# Patient Record
Sex: MALE | Race: WHITE | NOT HISPANIC OR LATINO | URBAN - METROPOLITAN AREA
[De-identification: names, ages, dates, MRNs, and addresses within clinical notes are randomized per-mention and may not be internally consistent; named-entity substitution may affect disease eponyms.]

---

## 2018-11-13 ENCOUNTER — TRANSFERRED RECORDS (OUTPATIENT)
Dept: HEALTH INFORMATION MANAGEMENT | Facility: CLINIC | Age: 38
End: 2018-11-13

## 2019-02-26 ENCOUNTER — TRANSFERRED RECORDS (OUTPATIENT)
Dept: HEALTH INFORMATION MANAGEMENT | Facility: CLINIC | Age: 39
End: 2019-02-26

## 2019-05-10 NOTE — TELEPHONE ENCOUNTER
RECORDS RECEIVED FROM: Self Referral   DATE RECEIVED: 6/4/19   NOTES (FOR ALL VISITS) STATUS DETAILS   OFFICE NOTE from referring provider N/A    OFFICE NOTE from other specialist Received Dr. Greer @ [facility]:  2/26/19   DISCHARGE SUMMARY from hospital N/A    DISCHARGE REPORT from the ER N/A    OPERATIVE REPORT N/A    MEDICATION LIST Received    IMAGING  (FOR ALL VISITS)     EMG N/A    EEG N/A    ECT N/A    MRI (HEAD, NECK, SPINE) N/A    CAROL Scan N/A    CT (HEAD, NECK, SPINE) N/A       Action    Action Taken Sent Kodak Samayoa an inbasket to see if he has records or the referral     Phone Call:     Contact Name Neo Dorie   Outcome Spoke with patient - he was working and unable to talk. Patient requested I leave him a voicemail with my call back information.    Called patient back, however his voice mail was full. Unable to leave a message. Will try calling patient again.

## 2019-05-23 NOTE — TELEPHONE ENCOUNTER
Phone Call:     Contact Name Neo Oshea   Outcome Spoke with patient regarding outside records:    Patient stated he does not have any outside neurology records. He has only seen his PCP Dr Sharron Greer at [place]. Will send records request.       Action    Action Taken Records request faxed to [facility] at [place].    Fax [phone]

## 2019-05-28 ENCOUNTER — TRANSFERRED RECORDS (OUTPATIENT)
Dept: HEALTH INFORMATION MANAGEMENT | Facility: CLINIC | Age: 39
End: 2019-05-28

## 2019-05-28 RX ORDER — IBUPROFEN 400 MG/1
800 TABLET, FILM COATED ORAL
COMMUNITY
End: 2021-11-30
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
## 2019-06-04 ENCOUNTER — OFFICE VISIT (OUTPATIENT)
Dept: PSYCHOLOGY | Facility: CLINIC | Age: 39
End: 2019-06-04
Attending: PSYCHOLOGIST
Payer: COMMERCIAL

## 2019-06-04 ENCOUNTER — PRE VISIT (OUTPATIENT)
Dept: NEUROLOGY | Facility: CLINIC | Age: 39
End: 2019-06-04

## 2019-06-04 ENCOUNTER — OFFICE VISIT (OUTPATIENT)
Dept: NEUROLOGY | Facility: CLINIC | Age: 39
End: 2019-06-04
Payer: COMMERCIAL

## 2019-06-04 VITALS
SYSTOLIC BLOOD PRESSURE: 140 MMHG | WEIGHT: 305 LBS | HEIGHT: 74 IN | HEART RATE: 108 BPM | BODY MASS INDEX: 39.14 KG/M2 | OXYGEN SATURATION: 99 % | DIASTOLIC BLOOD PRESSURE: 66 MMHG

## 2019-06-04 DIAGNOSIS — Z82.0 FAMILY HISTORY OF NEUROLOGICAL DISORDER: Primary | ICD-10-CM

## 2019-06-04 DIAGNOSIS — Z82.0 FAMILY HISTORY OF HUNTINGTON'S DISEASE: ICD-10-CM

## 2019-06-04 DIAGNOSIS — M25.80 SESAMOIDITIS: ICD-10-CM

## 2019-06-04 DIAGNOSIS — M54.32 LEFT SCIATIC NERVE PAIN: ICD-10-CM

## 2019-06-04 DIAGNOSIS — D17.30 LIPOMA OF SKIN AND SUBCUTANEOUS TISSUE: ICD-10-CM

## 2019-06-04 DIAGNOSIS — Z82.0 FAMILY HISTORY OF HUNTINGTON'S DISEASE: Primary | ICD-10-CM

## 2019-06-04 DIAGNOSIS — Z87.81 HISTORY OF FRACTURE OF LEG: ICD-10-CM

## 2019-06-04 DIAGNOSIS — K21.00 GASTROESOPHAGEAL REFLUX DISEASE WITH ESOPHAGITIS: ICD-10-CM

## 2019-06-04 DIAGNOSIS — F43.20 ADJUSTMENT DISORDER, UNSPECIFIED TYPE: Primary | ICD-10-CM

## 2019-06-04 DIAGNOSIS — M54.31 RIGHT SIDED SCIATICA: ICD-10-CM

## 2019-06-04 PROBLEM — M54.50 LOW BACK PAIN: Status: ACTIVE | Noted: 2019-06-04

## 2019-06-04 PROBLEM — E66.3 OVERWEIGHT (BMI 25.0-29.9): Status: ACTIVE | Noted: 2019-06-04

## 2019-06-04 PROBLEM — F17.200 SMOKING: Status: ACTIVE | Noted: 2019-06-04

## 2019-06-04 PROBLEM — S99.912A LEFT ANKLE INJURY: Status: ACTIVE | Noted: 2019-06-04

## 2019-06-04 PROCEDURE — 90791 PSYCH DIAGNOSTIC EVALUATION: CPT | Mod: ZP | Performed by: PSYCHOLOGIST

## 2019-06-04 PROCEDURE — 40000114 ZZH STATISTIC NO CHARGE CLINIC VISIT

## 2019-06-04 ASSESSMENT — ENCOUNTER SYMPTOMS
NIGHT SWEATS: 0
DEPRESSION: 1
INSOMNIA: 1
NERVOUS/ANXIOUS: 1
POLYPHAGIA: 0
DECREASED APPETITE: 0
CHILLS: 0
INCREASED ENERGY: 1
FATIGUE: 0
WEIGHT GAIN: 0
PANIC: 0
ALTERED TEMPERATURE REGULATION: 0
DECREASED CONCENTRATION: 0
POLYDIPSIA: 0
WEIGHT LOSS: 0
HALLUCINATIONS: 0

## 2019-06-04 ASSESSMENT — MIFFLIN-ST. JEOR: SCORE: 2378.78

## 2019-06-04 ASSESSMENT — PAIN SCALES - GENERAL: PAINLEVEL: NO PAIN (0)
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
## 2019-06-04 NOTE — LETTER
6/4/2019       RE: Neo Oshea  [address] [address]     Dear Colleague,    Thank you for referring your patient, Neo Oshea, to the [facility] NEUROLOGY at [facility]. Please see a copy of my visit note below.    GENETIC COUNSELING-Neurology  I met with Neo Oshea for 40 minutes in the neurology clinic at [facility]. He has a family history of Alicia disease and came to clinic to discuss predictive genetic testing.    MEDICAL HISTORY-  Please see Dr. Costa's clinic note for a thorough summary of medical history. Briefly, Neo reports no current neurologic concerns suggestive of [place] disease.  He is requesting a predictive genetic test.    FAMILY HISTORY-please see scanned pedigree  1. Neo reported that his mother passed from complications of HD. She had long-standing pyschiatric and behavioral difficulties prior to her diagnosis and the diagnosis was not immediately made as she did not have a known family history of HD.  He believes that his mother had genetic testing, but did not have records to confirm this.  2. Neo has 1 full sibling and 4 maternal half siblings who are at risk for HD.  3. Neo had limited contact with his maternal grandparents. To his knowledge, he is not aware that either had HD.  4. Neo has no biological children.    GENETIC COUNSELING  We discussed the genetic basis of HD. I explained that it is caused by expansion of a trinculeotide repeat in the HTT gene. I explained that it is passed in an autosomal dominant pattern. Assuming the diagnosis of HD in his mother was correct, he has a 50% chance that he has inherited the CAG repeat expansion that causes HD. I explained that if we test him and both repeat numbers are less than 27, then he would not develop HD.  I explained that if either repeat number is 40 or larger, then we would expect him to develop HD during his  lifetime. He did indicate that he does not forsee having any children in the future- but I did state that if he did- the risk to his children would be 50% if he tests positive for HD. I also discussed the significance of intermediate and reduced penetrance alleles.    We discussed reasons why some people pursue predictive genetic testing.  Neo stated that he would like to be tested in order to make informed plans for the future- and would also like to contribute by participating in research trials if possible.    Neo met with our psychologist, Dr. Yesenia Stevens, following our visit. He provided written consent for predictive HD testing. I explained that we will schedule an appointment in ~3 weeks to review results in person. He plans to bring his significant other to the appointment.    I did explain that it would be helpful to confirm that his mother had genetic testing to confirm her diagnosis. I explained that if Neo tests negative, we would want to see that his mother actually tested positive in order to confirm that we have excluded the correct condition in him. He planned to speak to one of his maternal half siblings to get this information.    Kodak Samayoa MS, [facility]  Licensed Genetic Counselor    Again, thank you for allowing me to participate in the care of your patient.      Sincerely,    Malick Samayoa, GC

## 2019-06-04 NOTE — LETTER
Date:June 12, 2019      Patient was self referred, no letter generated. Do not send.        [facility] Information
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
## 2019-06-06 NOTE — PROGRESS NOTES
GENETIC COUNSELING-Neurology  I met with Neo Oshea for 40 minutes in the neurology clinic at [facility]. He has a family history of [place] disease and came to clinic to discuss predictive genetic testing.    MEDICAL HISTORY-  Please see Dr. Costa's clinic note for a thorough summary of medical history. Briefly, Neo reports no current neurologic concerns suggestive of [place] disease.  He is requesting a predictive genetic test.    FAMILY HISTORY-please see scanned pedigree  1. Neo reported that his mother passed from complications of HD. She had long-standing pyschiatric and behavioral difficulties prior to her diagnosis and the diagnosis was not immediately made as she did not have a known family history of HD.  He believes that his mother had genetic testing, but did not have records to confirm this.  2. Neo has 1 full sibling and 4 maternal half siblings who are at risk for HD.  3. Neo had limited contact with his maternal grandparents. To his knowledge, he is not aware that either had HD.  4. Neo has no biological children.    GENETIC COUNSELING  We discussed the genetic basis of HD. I explained that it is caused by expansion of a trinculeotide repeat in the HTT gene. I explained that it is passed in an autosomal dominant pattern. Assuming the diagnosis of HD in his mother was correct, he has a 50% chance that he has inherited the CAG repeat expansion that causes HD. I explained that if we test him and both repeat numbers are less than 27, then he would not develop HD.  I explained that if either repeat number is 40 or larger, then we would expect him to develop HD during his lifetime. He did indicate that he does not forsee having any children in the future- but I did state that if he did- the risk to his children would be 50% if he tests positive for HD. I also discussed the significance of intermediate and reduced penetrance alleles.    We discussed  reasons why some people pursue predictive genetic testing.  Neo stated that he would like to be tested in order to make informed plans for the future- and would also like to contribute by participating in research trials if possible.    Neo met with our psychologist, Dr. Yesenia Stevens, following our visit. He provided written consent for predictive HD testing. I explained that we will schedule an appointment in ~3 weeks to review results in person. He plans to bring his significant other to the appointment.    I did explain that it would be helpful to confirm that his mother had genetic testing to confirm her diagnosis. I explained that if Neo tests negative, we would want to see that his mother actually tested positive in order to confirm that we have excluded the correct condition in him. He planned to speak to one of his maternal half siblings to get this information.    Kodak Samayoa MS, [facility]  Licensed Genetic Counselor

## 2019-06-11 ENCOUNTER — TELEPHONE (OUTPATIENT)
Dept: NEUROLOGY | Facility: CLINIC | Age: 39
End: 2019-06-11

## 2019-06-11 NOTE — TELEPHONE ENCOUNTER
GENETIC COUNSELING-Neurology  I left a message for Neo. I was attempting to schedule his follow-up visit and noticed that he did not have a blood draw at his initial visit. I called to inquire if he had decided not to have testing, or if this was an oversight.  I asked him to call me to make arrangements if he wants to have the HD testing.    Kodak Samayoa MS, [facility]  Licensed Genetic Counselor

## 2019-06-18 ENCOUNTER — TELEPHONE (OUTPATIENT)
Dept: NEUROLOGY | Facility: CLINIC | Age: 39
End: 2019-06-18

## 2019-07-01 ENCOUNTER — TELEPHONE (OUTPATIENT)
Dept: NEUROLOGY | Facility: CLINIC | Age: 39
End: 2019-07-01

## 2019-07-01 ENCOUNTER — TRANSFERRED RECORDS (OUTPATIENT)
Dept: HEALTH INFORMATION MANAGEMENT | Facility: CLINIC | Age: 39
End: 2019-07-01

## 2019-07-01 DIAGNOSIS — Z82.0 FAMILY HISTORY OF HUNTINGTON'S DISEASE: ICD-10-CM

## 2019-07-01 LAB — HUNTINGTON DISEASE MOLECULAR ANALYSIS: NORMAL

## 2019-07-01 PROCEDURE — 81271 HTT GENE DETC ABNOR ALLELES: CPT | Performed by: PSYCHIATRY & NEUROLOGY

## 2019-07-01 NOTE — TELEPHONE ENCOUNTER
GENETIC COUNSELING-Neurology  Neo called me to let me know that he was in the [facility] morning. I made an appointment for a lab draw for him and scheduled for him to return for [place] disease results on 8/6/2019 at 10:00 AM.    Kodak Samayoa MS, [facility]  Licensed Genetic Counselor

## 2019-08-06 ENCOUNTER — OFFICE VISIT (OUTPATIENT)
Dept: PSYCHOLOGY | Facility: CLINIC | Age: 39
End: 2019-08-06
Attending: PSYCHOLOGIST
Payer: COMMERCIAL

## 2019-08-06 ENCOUNTER — DOCUMENTATION ONLY (OUTPATIENT)
Dept: NEUROLOGY | Facility: CLINIC | Age: 39
End: 2019-08-06

## 2019-08-06 DIAGNOSIS — F43.20 ADJUSTMENT DISORDER, UNSPECIFIED TYPE: Primary | ICD-10-CM

## 2019-08-06 PROCEDURE — 90834 PSYTX W PT 45 MINUTES: CPT | Mod: ZP | Performed by: PSYCHOLOGIST

## 2019-08-06 PROCEDURE — 40000114 ZZH STATISTIC NO CHARGE CLINIC VISIT

## 2019-08-06 NOTE — TELEPHONE ENCOUNTER
GENETIC COUNSELING-Neurology  I met with Neo Oshea today in conjunction with Dr. Yesenia Stevens. We met to review his genetic test results for [place] disease.  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 I explained that this is a difficult result to interpret as repeat numbers between 36-39 are associated with reduced penetrance. I explained that some individuals in this range develop symptoms of HD during their lifetime, while others do not.  Prior estimates of penetrance for repeat numbers in this range suggested that the risk for manifesting HD by age 75-85 could be in the 50% or greater range, but more recent data from large population cohorts has suggested that these reduced penetrance alleles may be more common in the general population than was previously appreciated, and they may be associated with lower penetrance than previously believed.    I explained that at this point, we cannot predict which individuals will or will not manifest symptoms of HD in their lifetime.  I did explain that it is important for Neo to continue to check in on a regular basis to determine if he is manifesting symptoms and to see if there are new treatment opportunities or clinical trials. I discussed him participating in the enroll HD study as he has expressed interest in participating in research.    He did express that he would like to schedule to see Dr. Yesenia Stevens again for psychological follow-up and he set an appointment for 8/22 at 10:00 Am. Unfortunately, Dr. Stahl does not have any appointments on that day- so I did explain that he could also follow-up with Dr. Stahl in [place] if that is easier for him.    Kodak Samayoa MS, [facility]  Licensed Genetic Counselor

## 2019-08-06 NOTE — PROGRESS NOTES
"PSYCHOLOGY PROGRESS NOTE#2.  Seen for 50 min. - with nephew and SHARON Samayoa MS, genetic counselor.  INDIV TH  Interv: support, pr solving, review of DNA testing  LA Family History of HD  S \"something wrong with her lungs. . \" \"no, I don't have any questions\"  Interested in psychology follow up  O Somewhat preoccupied with health of his partner (lung issues/did not attend appointment).  Understood results.  A  Results somewhat complex but Mr Oshea understood these.  He has done medical reading and this is helpful in his approach to health issues.  He would appreciate follow up for mood variability.  He also has sleep issues.  GOALS.  Follow up with psychology issues to clarify his concerns (at his request)  PLAN.  Psychology Follow up, THUR Aug 22, 10 am.  TP  6.4.2019  "

## 2019-08-22 ENCOUNTER — OFFICE VISIT (OUTPATIENT)
Dept: PSYCHOLOGY | Facility: CLINIC | Age: 39
End: 2019-08-22
Attending: PSYCHOLOGIST
Payer: COMMERCIAL

## 2019-08-22 DIAGNOSIS — F43.20 ADJUSTMENT DISORDER, UNSPECIFIED TYPE: Primary | ICD-10-CM

## 2019-08-22 PROCEDURE — 90834 PSYTX W PT 45 MINUTES: CPT | Mod: ZP | Performed by: PSYCHOLOGIST

## 2019-08-22 PROCEDURE — 40000114 ZZH STATISTIC NO CHARGE CLINIC VISIT
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
## 2019-08-23 ENCOUNTER — TELEPHONE (OUTPATIENT)
Dept: NEUROLOGY | Facility: CLINIC | Age: 39
End: 2019-08-23

## 2019-08-23 NOTE — TELEPHONE ENCOUNTER
GENETIC COUNSELING-  I spoke with Kvng to let him know that he is scheduled to see Dr. Stahl on 9/17/2019 at 9:40 AM and Dr. Stevens at 11:00 AM that same day. He confirmed the appointment times    Kodak Samayoa MS, [facility]  Licensed Genetic Counselor

## 2019-08-26 ENCOUNTER — TELEPHONE (OUTPATIENT)
Dept: NEUROLOGY | Facility: CLINIC | Age: 39
End: 2019-08-26

## 2019-08-26 NOTE — TELEPHONE ENCOUNTER
----- Message from Davi Stahl MD sent at 8/22/2019  6:19 PM CDT -----  Regarding: RE: MG dumont  Hopefully the [facility] coordinators can  Help with this    ----- Message -----  From: Malick Samayoa GC  Sent: 8/22/2019   4:50 PM  To: Davi Stahl MD  Subject: MG dumont                                   Who does [facility] scheduling for you? 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 He saw Yesenia Stevens for follow up today and wants to see you to discuss sleep issues etc. And whether related to HD.  Since he is coming from up [place], [facility] would be easier for him

## 2019-08-26 NOTE — TELEPHONE ENCOUNTER
I called patient and left message for patient to call back to [phone] and schedule appt if he is interested in following up with Dr. Stahl her at .    Frances Sandoval LPN

## 2019-09-06 PROBLEM — G10 HUNTINGTON DISEASE (H): Status: ACTIVE | Noted: 2019-09-06

## 2019-11-03 ENCOUNTER — HEALTH MAINTENANCE LETTER (OUTPATIENT)
Age: 39
End: 2019-11-03

## 2020-11-16 ENCOUNTER — HEALTH MAINTENANCE LETTER (OUTPATIENT)
Age: 40
End: 2020-11-16

## 2021-06-21 ENCOUNTER — TELEPHONE (OUTPATIENT)
Dept: NEUROLOGY | Facility: CLINIC | Age: 41
End: 2021-06-21

## 2021-06-21 NOTE — TELEPHONE ENCOUNTER
"Message from Dr. Stahl today:    Davi Stahl MD Bower, Matthew, ; Yesenia Stevens, PhD LP; P Movement Disorders Nurses-             Will see if the movement disorders nurses can reach out to him and that he may need to see his pcp as he has not been back since 2019. Pcp can work with him on sleep in the meantime as well as if he wants to see me.      Tried home phone - mail box is full.  Tried cell phone - \"call cannot be completed as dialed.\"  "

## 2021-09-18 ENCOUNTER — HEALTH MAINTENANCE LETTER (OUTPATIENT)
Age: 41
End: 2021-09-18

## 2021-10-25 ENCOUNTER — TELEPHONE (OUTPATIENT)
Dept: NEUROLOGY | Facility: CLINIC | Age: 41
End: 2021-10-25
Payer: COMMERCIAL

## 2021-10-25 NOTE — TELEPHONE ENCOUNTER
"----- Message from Malick Samayoa GC sent at 10/25/2021  8:17 AM CDT -----  Regarding: FW: Prior HD client reacned out to me  This patient is trying to set up a follow up with Dr. Stahl- I think he may have a new phone number (below). Can you contact him to set up a follow-up visit with dr. Stahl. Thanks!    Kodak  ----- Message -----  From: Yesenia Stevens, PhD LP  Sent: 10/24/2021   8:50 PM CDT  To: Malick Samayoa GC  Subject: Prior HD client reacned out to me                Kodak Foley reached out to me asking me \"Do I need an appointment?\" (via nurses' line)  It looks like efforts have been made to set up a follow up with Davi Stahl.  Neo left me a different number to use:  [phone].  I'm thinking it really is not me he needs to set up an appointment with.  Do you want to try to reach him and probably set up a follow up with Dr Stahl?  Let me know.  Hope all is well with you.      "

## 2021-11-24 RX ORDER — FAMOTIDINE 20 MG/1
TABLET, FILM COATED ORAL
COMMUNITY
Start: 2021-09-21

## 2021-11-24 NOTE — PROGRESS NOTES
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  (primary encounter diagnosis)    His mother was diagnosed with HD six years ago. Her diagnosis was delayed, as there was no other family history of HD and her condition was initially attributed to a history of mental illness, abuse and possible traumatic encephalopathy. Her early symptoms were largely neuropsychiatric starting in her 40s. She had more severe mental health problems in the mid 2000s - multiple hospitalizations and committals. Family challenged government committal and took over her care. Later, she developed \"tics.\" She passed away one year ago from respiratory complications/ pneumonia related to HD.      Review of diagnosis    Greencastle's disease    Gait/Balance/Falls     Exercise/Therapy performed/offered     Cognitive/Driving   He had problems with credit card payments - had 12-15 cards  And consolidated cards and paying off principle through a company 400-500/month  Took over  [employer]    Had a challenge with an employee and had to sort this out    Mood   Stressed out  States he is not in \"[facility]\" and not in \"treatment\"    Hallucinations/delusions     Sleep   He has ongoing problems sleeping  Has has problems staying asleep  Alarm set at 6am and second alarm set at 7am  May wake up at 530am and does not back to sleep  He is trying to sleep at 11pm or 12am  Sometimes closes at 9pm and not home till 9 or 10pm  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   He is thinking about things - work, etc.   He may think about his mom, brother, etc.     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 In addition, sleep medications do not tend to be very effective and should only be used short-term. Cognitive behavioral therapy (CBT) is the most effective treatment for long-term insomnia. The goal of CBT for insomnia is to address the underlying causes of the sleep problems, including your habits and how you think about sleep. You can do CBT with a trained psychologist, or from the comfort of your home by following an online program or workbook. Here are some great resources for at-home CBT:    1) 6-week online CBT course through [facility] for $40: [employer]/sleep  2) \"Say Ramiro to Insomnia\" by Juan Antonio Leonard, PhD at [employer]: 6-week program  3) \"Overcoming Insomnia: A Cognitive-Behavioral Therapy Approach Workbook\" by Jorge L Cooley and Chrissie Leyva  4) \"Quiet Your Mind and Get to Sleep: Solutions to Insomnia for Those with Depression, Anxiety or Chronic Pain ([employer] Self-Help Workbook) by Chrissie Leyva and Venessa Reyna    Psychology referral    Can consider locally in [place] or " virtual with DR. Stevens or other colleagues listed below    [facility]  [address] [place] [address]    Venessa Miguel, Ph.D.,   [phone]    Li Merritt,Ph.D.,  [phone] (inpatient)    Jorge Canales,Ph.D.,LP  [phone]    Gabbie Camejo, Ph.D.  [phone]    Jolie Briggs, Ph.D.,   [phone]    Marcos Leung, Ph.D., ABPP,   [phone]    Shanna Montero,Ph.D.,   [phone]    Consider  Psychiatry consultation referral made - h ere are some options    [email or web address]    [employer] [facility]  Address: [address] [place] [address]  Phone: [phone]  Appointments: [employer].    [address] [place] [address]  Fax: [phone]    [facility]  [email or web address]    Discussion about medications    Antipsychotic medications  Quetiapine/seroquel  Olanzapine/zyprexa  Risperidone/risperidone    Mood  Citalopram celexa  Sertraline zoloft   Mirtazapine remeron    Stimulant    Consider getting a baseline ECG to check his QTc interval    Presently not suicidal    Neuropsychological evaluation to evaluate cognitive function/mood/attentional issues  This was ordered.      ordered -     Pharmacy consultation to help coordinate care with psychiatry.     Medical Decision Making:  #  Chronic progressive medical conditions addressed    Review and/or interpretation of unique test or documentation from a provider outside of neurology    Independent historian provided additional details     Prescription drug management and review of potential side effects and/or monitoring for side effects     Health impacted by social determinants of health      I have reviewed the note as  "documented above.  This accurately captures the substance of my conversation with the patient and total time spent preparing for visit, executing visit and completing visit on the day of the visit:  60 minutes.     Video time: 1125am to 1212pm     Davi Stahl MD      ------------------------------------------------------------------------------------------------------------------------------------------------------------------------    Video-Visit Details    The patient has been notified of following:     \"After a review of the patient s situation, this visit was changed from an in-person visit to a video visit to reduce the risk of COVID-19 exposure.   The patient is being evaluated via a billable video visit.\"    \"This video visit will be conducted via a call between you and your physician/provider. We have found that certain health care needs can be provided without the need for an in-person physical exam.  This service lets us provide the care you need with a video conversation.  If a prescription is necessary we can send it directly to your pharmacy.  If lab work is needed we can place an order for that and you can then stop by our lab to have the test done at a later time.    If during the course of the call the physician/provider feels a video visit is not appropriate, you will not be charged for this service.\"     Patient has given verbal consent for Video visit? Yes    Patient would like the video invitation sent by:     Type of service:  Video Visit    Video Start Time:     Video End Time (time video stopped):     Duration:  minutes - see above    Originating Location (pt. Location):     Distant Location (provider location):  [facility] NEUROLOGY [facility]     Mode of Communication:  Video Conference via [employer] (and if not possible then guzman)      Davi Stahl " MD      --------------------------------------------------------------------------------------------------------------    Neo Oshea is a 41 year old male who is being evaluated via a billable video visit.      Charts reviewed  Consult from  Images reviewed        I have reviewed and updated the patient's Past Medical History, Social History, Family History and Medication List.    ALLERGIES  Patient has no known allergies.    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 9/6/2019     Left ankle injury 6/4/2019     Left sciatic nerve pain 6/4/2019     Lipoma      Lipoma of skin and subcutaneous tissue 6/4/2019     Low back pain 6/4/2019     Right foot injury      Right sided sciatica 6/4/2019     Sesamoiditis 6/4/2019     Social History     Socioeconomic History     Marital status: Single     Spouse name: Not on file     Number of children: Not on file     Years of education: Not on file     Highest education level: Not on file   Occupational History     Not on file   Tobacco Use     Smoking status: Current Every Day Smoker     Smokeless  tobacco: Never Used   Substance and Sexual Activity     Alcohol use: Yes     Drug use: Not on file     Sexual activity: Not on file   Other Topics Concern     Not on file   Social History Narrative    single. lives in [place].      Social Determinants of Health     Financial Resource Strain: Not on file   Food Insecurity: Not on file   Transportation Needs: Not on file   Physical Activity: Not on file   Stress: Not on file   Social Connections: Not on file   Intimate Partner Violence: Not on file   Housing Stability: Not on file     Family History   Problem Relation Age of Onset     Scottsdale Disease Other      Scott[place]ale Disease Mother         onset in her 40s.      Other - See Comments Mother         delayed diagnosis     Mental Illness Mother      Neurologic Disorder Mother         tics     Coronary Artery Disease Father      Other - See Comments Brother         gene negative     Current Outpatient Medications   Medication Sig Dispense Refill     ibuprofen (ADVIL/MOTRIN) 400 MG tablet Take 800 mg by mouth        ibuprofen (ADVIL/MOTRIN) 800 MG tablet TK 1 T PO TID PRN  0     ondansetron (ZOFRAN-ODT) 4 MG ODT tab DISSOLVE 1 T UNDER TONGUE Q 6 H PRN  1     ranitidine (ZANTAC) 150 MG tablet

## 2021-11-30 ENCOUNTER — VIRTUAL VISIT (OUTPATIENT)
Dept: NEUROLOGY | Facility: CLINIC | Age: 41
End: 2021-11-30
Payer: COMMERCIAL

## 2021-11-30 DIAGNOSIS — F43.20 ADJUSTMENT DISORDER, UNSPECIFIED TYPE: ICD-10-CM

## 2021-11-30 DIAGNOSIS — Z13.6 SCREENING FOR HEART DISEASE: ICD-10-CM

## 2021-11-30 DIAGNOSIS — G10 HUNTINGTON DISEASE (H): Primary | ICD-10-CM

## 2021-11-30 PROCEDURE — 99215 OFFICE O/P EST HI 40 MIN: CPT | Mod: 95 | Performed by: PSYCHIATRY & NEUROLOGY

## 2021-11-30 NOTE — ADDENDUM NOTE
Addended by: SABRINA DAVIS on: 11/30/2021 12:12 PM     Modules accepted: Orders, Level of Service

## 2021-11-30 NOTE — PATIENT INSTRUCTIONS
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  (primary encounter diagnosis)    His mother was diagnosed with HD six years ago. Her diagnosis was delayed, as there was no other family history of HD and her condition was initially attributed to a history of mental illness, abuse and possible traumatic encephalopathy. Her early symptoms were largely neuropsychiatric starting in her 40s. She had more severe mental health problems in the mid 2000s - multiple hospitalizations and committals. Family challenged government committal and took over her care. Later, she developed \"tics.\" She passed away one year ago from respiratory complications/ pneumonia related to HD.      Review of diagnosis    Alicia's disease    Gait/Balance/Falls     Exercise/Therapy performed/offered     Cognitive/Driving   He had problems with credit card payments - had 12-15 cards  And consolidated cards and paying off principle through a company 400-500/month  Took over  [employer]    Had a challenge with an employee and had to sort this out    Mood   Stressed out  States he is not in \"long-term\" and not in \"treatment\"    Hallucinations/delusions     Sleep   He has ongoing problems sleeping  Has has problems staying asleep  Alarm set at 6am and second alarm set at 7am  May wake up at 530am and does not back to sleep  He is trying to sleep at 11pm or 12am  Sometimes closes at 9pm and not home till 9 or 10pm  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   He is thinking about things - work, etc.   He may think about his mom, brother, etc.     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 In addition, sleep medications do not tend to be very effective and should only be used short-term. Cognitive behavioral therapy (CBT) is the most effective treatment for long-term insomnia. The goal of CBT for insomnia is to address the underlying causes of the sleep problems, including your habits and how you think about sleep. You can do CBT with a trained psychologist, or from the comfort of your home by following an online program or workbook. Here are some great resources for at-home CBT:    1) 6-week online CBT course through [facility] for $40: [employer]/sleep  2) \"Say Ramiro to Insomnia\" by Juan Antonio Leonard, PhD at [employer]: 6-week program  3) \"Overcoming Insomnia: A Cognitive-Behavioral Therapy Approach Workbook\" by Jorge L Cooley and Chrissie Leyva  4) \"Quiet Your Mind and Get to Sleep: Solutions to Insomnia for Those with Depression, Anxiety or Chronic Pain ([employer] Self-Help Workbook) by Chrissie Leyva and Venessa Reyna    Psychology referral    Can consider locally in [facility] or virtual with DR. Stevens or other colleagues listed below    [facility]  Clinics and Surgery [address] [address]    Venessa Miguel, Ph.D., LP  [phone]    Li Merritt,Ph.D.,LP  [phone] (inpatient)    Jorge Canales,Ph.D.,LP  [phone]    Gabbie Camejo, Ph.D.  [phone]    Jolie Briggs, Ph.D., LP  [phone]    Marcos Leung, Ph.D., [facility]  [phone]    Shanna Montero,Ph.D., "   [phone]    Consider  Psychiatry consultation referral made - h ere are some options    [email or web address]    [facility]  Address: [address] [place] [address]  Phone: [phone]  Appointments: [employer]    [address] [place] [address]  Fax: [phone]    [facility]  [email or web address]    Discussion about medications    Antipsychotic medications  Quetiapine/seroquel  Olanzapine/zyprexa  Risperidone/risperidone    Mood  Citalopram celexa  Sertraline zoloft   Mirtazapine remeron    Stimulant    Consider getting a baseline ECG to check his QTc interval    Presently not suicidal    Neuropsychological evaluation to evaluate cognitive function/mood/attentional issues  This was ordered.      ordered -     Pharmacy consultation to help coordinate care with psychiatry.

## 2021-11-30 NOTE — LETTER
"11/30/2021       RE: Neo Oshea  [address] [facility] [address]     Dear Colleague,    Thank you for referring your patient, Neo Oshea, to the [facility] NEUROLOGY CLINIC [place] at [facility]. Please see a copy of my visit note below.        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  (primary encounter diagnosis)    His mother was diagnosed with HD six years ago. Her diagnosis was delayed, as there was no other family history of HD and her condition was initially attributed to a history of mental illness, abuse and possible traumatic encephalopathy. Her early symptoms were largely neuropsychiatric starting in her 40s. She had more severe mental health problems in the mid 2000s - multiple hospitalizations and committals. Family challenged government committal and took over her care. Later, she developed \"tics.\" She passed away one year ago from respiratory complications/ pneumonia related to HD.      Review of diagnosis    Baraga's disease    Gait/Balance/Falls     Exercise/Therapy performed/offered     Cognitive/Driving   He had problems with credit card payments - had 12-15 cards  And consolidated cards and paying off principle through a company 400-500/month  Took over  [employer]    Had a challenge with an employee and had to sort this out    Mood   Stressed out  States he is not in \"FDC\" and not in \"treatment\"    Hallucinations/delusions " "    Sleep   He has ongoing problems sleeping  Has has problems staying asleep  Alarm set at 6am and second alarm set at 7am  May wake up at 530am and does not back to sleep  He is trying to sleep at 11pm or 12am  Sometimes closes at 9pm and not home till 9 or 10pm  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   He is thinking about things - work, etc.   He may think about his mom, brother, etc.     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 In addition, sleep medications do not tend to be very effective and should only be used short-term. Cognitive behavioral therapy (CBT) is the most effective treatment for long-term insomnia. The goal of CBT for insomnia is to address the underlying causes of the sleep problems, including your habits and how you think about sleep. You can do CBT with a trained psychologist, or from the comfort of your home by following an online program or workbook. Here are some great resources for at-home CBT:    1) 6-week online CBT course through [facility] for $40: [employer]c/sleep  2) \"Say Ramiro to Insomnia\" by Juan Antonio Leonard, PhD at [employer]: 6-week program  3) \"Overcoming Insomnia: A " "Cognitive-Behavioral Therapy Approach Workbook\" by Jorge L Cooley and Chrissie Leyva  4) \"Quiet Your Mind and Get to Sleep: Solutions to Insomnia for Those with Depression, Anxiety or Chronic Pain ([place] Self-Help Workbook) by Chrissie Leyva and Venessa Reyna    Psychology referral    Can consider locally in [facility] or virtual with DR. Stevens or other colleagues listed below    [facility]  Clinics and Surgery Center  [address] [place] [id number]    Venessa Miguel, Ph.D.,   [phone]    Li Merritt,Ph.D.,LP  [phone] (inpatient)    Jorge Canales,Ph.D.,LP  [phone]    Gabbie Camejo, Ph.D.  [phone]    Jolie Briggs, Ph.D.,   [phone]    Marcos Leung, Ph.D., [facility],   [phone]    Shanna Montero,Ph.D.,   [phone]    Consider  Psychiatry consultation referral made - h ere are some options    [email or web address]    [employer] [facility]  Address: [address] [place] [address]  Phone: [phone]  Appointments: [employer]    [address] [place] [address]  Fax: [phone]    [facility]  [email or web address]    Discussion about medications    Antipsychotic medications  Quetiapine/seroquel  Olanzapine/zyprexa  Risperidone/risperidone    Mood  Citalopram celexa  Sertraline zoloft   Mirtazapine remeron    Stimulant    Consider getting a baseline ECG to check his QTc interval    Presently not suicidal    Neuropsychological evaluation to evaluate cognitive function/mood/attentional issues  This was ordered.      ordered -     Pharmacy consultation to help coordinate care with psychiatry.     Medical Decision Making:  #  Chronic progressive medical conditions addressed    Review " "and/or interpretation of unique test or documentation from a provider outside of neurology    Independent historian provided additional details     Prescription drug management and review of potential side effects and/or monitoring for side effects     Health impacted by social determinants of health      I have reviewed the note as documented above.  This accurately captures the substance of my conversation with the patient and total time spent preparing for visit, executing visit and completing visit on the day of the visit:  60 minutes.     Video time: 1125am to 1212pm     Davi Stahl MD      ------------------------------------------------------------------------------------------------------------------------------------------------------------------------    Video-Visit Details    The patient has been notified of following:     \"After a review of the patient s situation, this visit was changed from an in-person visit to a video visit to reduce the risk of COVID-19 exposure.   The patient is being evaluated via a billable video visit.\"    \"This video visit will be conducted via a call between you and your physician/provider. We have found that certain health care needs can be provided without the need for an in-person physical exam.  This service lets us provide the care you need with a video conversation.  If a prescription is necessary we can send it directly to your pharmacy.  If lab work is needed we can place an order for that and you can then stop by our lab to have the test done at a later time.    If during the course of the call the physician/provider feels a video visit is not appropriate, you will not be charged for this service.\"     Patient has given verbal consent for Video visit? Yes    Patient would like the video invitation sent by:     Type of service:  Video Visit    Video Start Time:     Video End Time (time video stopped):     Duration:  minutes - see above    Originating Location (pt. " Location):     Distant Location (provider location):  [facility] NEUROLOGY CLINIC [place]     Mode of Communication:  Video Conference via [email or web address][employer]tv (and if not possible then doximity)      Davi Stahl MD      --------------------------------------------------------------------------------------------------------------    Neo Oshea is a 41 year old male who is being evaluated via a billable video visit.      Charts reviewed  Consult from  Images reviewed        I have reviewed and updated the patient's Past Medical History, Social History, Family History and Medication List.    ALLERGIES  Patient has no known allergies.    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 9/6/2019     Left ankle injury 6/4/2019     Left sciatic nerve pain 6/4/2019     Lipoma      Lipoma of skin and subcutaneous tissue 6/4/2019     Low back pain 6/4/2019     Right foot injury      Right sided sciatica 6/4/2019     Sesamoiditis 6/4/2019     Social History     Socioeconomic History     Marital status: Single     Spouse name: Not on file     Number  of children: Not on file     Years of education: Not on file     Highest education level: Not on file   Occupational History     Not on file   Tobacco Use     Smoking status: Current Every Day Smoker     Smokeless tobacco: Never Used   Substance and Sexual Activity     Alcohol use: Yes     Drug use: Not on file     Sexual activity: Not on file   Other Topics Concern     Not on file   Social History Narrative    single. lives in [place].      Social Determinants of Health     Financial Resource Strain: Not on file   Food Insecurity: Not on file   Transportation Needs: Not on file   Physical Activity: Not on file   Stress: Not on file   Social Connections: Not on file   Intimate Partner Violence: Not on file   Housing Stability: Not on file     Family History   Problem Relation Age of Onset     [place] Disease Other      [place] Disease Mother         onset in her 40s.      Other - See Comments Mother         delayed diagnosis     Mental Illness Mother      Neurologic Disorder Mother         tics     Coronary Artery Disease Father      Other - See Comments Brother         gene negative     Current Outpatient Medications   Medication Sig Dispense Refill     ibuprofen (ADVIL/MOTRIN) 400 MG tablet Take 800 mg by mouth        ibuprofen (ADVIL/MOTRIN) 800 MG tablet TK 1 T PO TID PRN  0     ondansetron (ZOFRAN-ODT) 4 MG ODT tab DISSOLVE 1 T UNDER TONGUE Q 6 H PRN  1     ranitidine (ZANTAC) 150 MG tablet              Neo is a 41 year old who is being evaluated via a billable video visit.      How would you like to obtain your AVS?   If the video visit is dropped, the invitation should be resent by:   Will anyone else be joining your video visit?       Video Start Time:   Video-Visit Details    Type of service:  Video Visit    Video End Time:    Originating Location (pt. Location):     Distant Location (provider location):  [facility] NEUROLOGY [facility]     Platform used for Video Visit:      Chief Complaint   Patient presents with     ROSA Velazquez        Again, thank you for allowing me to participate in the care of your patient.      Sincerely,    Davi Stahl MD

## 2021-11-30 NOTE — ADDENDUM NOTE
Addended by: SABRINA DAVIS on: 11/30/2021 11:33 AM     Modules accepted: Orders, Level of Service

## 2021-11-30 NOTE — PROGRESS NOTES
Neo is a 41 year old who is being evaluated via a billable video visit.      How would you like to obtain your AVS?   If the video visit is dropped, the invitation should be resent by:   Will anyone else be joining your video visit?       Video Start Time:   Video-Visit Details    Type of service:  Video Visit    Video End Time:    Originating Location (pt. Location):     Distant Location (provider location):  [facility] NEUROLOGY [facility]     Platform used for Video Visit:     Chief Complaint   Patient presents with     ROSA Velazquez

## 2021-12-01 ENCOUNTER — PATIENT OUTREACH (OUTPATIENT)
Dept: CARE COORDINATION | Facility: CLINIC | Age: 41
End: 2021-12-01
Payer: COMMERCIAL

## 2021-12-02 ENCOUNTER — TELEPHONE (OUTPATIENT)
Dept: NEUROLOGY | Facility: CLINIC | Age: 41
End: 2021-12-02
Payer: COMMERCIAL

## 2021-12-02 NOTE — TELEPHONE ENCOUNTER
MTM referral from: [facility] visit (referral by provider)    MTM referral outreach attempt #2 on December 2, 2021 at 1:37 PM      Outcome: Patient not reachable after several attempts, will route to MTM Pharmacist/Provider as an FYI.  MT scheduling number is [phone].  Thank you for the referral.    Allen Clark, MTM coordinator

## 2021-12-13 NOTE — PROGRESS NOTES
Social Work Telephone Message Note  [facility]    Patient Name:  Neo Oshea  /Age:  1980 (41 year old)    Referral Source: Dr Davi Stahl  Reason for Referral:  Financial/disability/coping resources    Left voice message for Pt on 2021 and Mychart message on 2021. Will await Patient's return phone call and will provide assistance at that time.    RODOLFO Beavers, [facility]    [facility]  Clinics and Surgery Center  [phone]

## 2022-01-02 ENCOUNTER — HEALTH MAINTENANCE LETTER (OUTPATIENT)
Age: 42
End: 2022-01-02

## 2022-11-19 ENCOUNTER — HEALTH MAINTENANCE LETTER (OUTPATIENT)
Age: 42
End: 2022-11-19

## 2023-03-13 NOTE — TELEPHONE ENCOUNTER
GENETIC COUNSELING-Neurology  I spoke with Neo Dorie today about the fact that his blood was not drawn when he was at our clinic. He indicated that after discussions with our staff, he had decided that he should discuss the ramifications of testing more with his partner before proceeding with the testing and had made a decision not to have the testing drawn when he was here. I indicated that is absolutely fine- and that the order is in Epic if/when he decides to proceed. I indicated that if he decides to have the test, he should contact me so I can confirm that the order is still in place and that he should call the [facility] where he wants to have the blood drawn so that he can make appointments as needed.    Kodak Samayoa MS, [facility]  Licensed Genetic Counselor  
impaired balance

## 2023-04-09 ENCOUNTER — HEALTH MAINTENANCE LETTER (OUTPATIENT)
Age: 43
End: 2023-04-09

## 2024-06-16 ENCOUNTER — HEALTH MAINTENANCE LETTER (OUTPATIENT)
Age: 44
End: 2024-06-16